# Patient Record
Sex: FEMALE | Race: WHITE | URBAN - METROPOLITAN AREA
[De-identification: names, ages, dates, MRNs, and addresses within clinical notes are randomized per-mention and may not be internally consistent; named-entity substitution may affect disease eponyms.]

---

## 2018-07-05 ENCOUNTER — APPOINTMENT (OUTPATIENT)
Age: 78
Setting detail: DERMATOLOGY
End: 2018-07-11

## 2018-07-05 DIAGNOSIS — L72.8 OTHER FOLLICULAR CYSTS OF THE SKIN AND SUBCUTANEOUS TISSUE: ICD-10-CM

## 2018-07-05 DIAGNOSIS — L60.8 OTHER NAIL DISORDERS: ICD-10-CM

## 2018-07-05 PROCEDURE — OTHER PRESCRIPTION: OTHER

## 2018-07-05 PROCEDURE — 99202 OFFICE O/P NEW SF 15 MIN: CPT

## 2018-07-05 PROCEDURE — OTHER TREATMENT REGIMEN: OTHER

## 2018-07-05 PROCEDURE — OTHER COUNSELING: OTHER

## 2018-07-05 PROCEDURE — OTHER MIPS QUALITY: OTHER

## 2018-07-05 RX ORDER — CARBIT/EQUIS XT/ETHAN/CHIT/MSM
LIQUID (ML) TOPICAL
Qty: 1 | Refills: 0 | Status: ERX | COMMUNITY
Start: 2018-07-05

## 2018-07-05 ASSESSMENT — LOCATION SIMPLE DESCRIPTION DERM
LOCATION SIMPLE: LEFT MIDDLE FINGERNAIL
LOCATION SIMPLE: LEFT THUMBNAIL
LOCATION SIMPLE: LEFT INDEX FINGERNAIL
LOCATION SIMPLE: RIGHT MIDDLE FINGERNAIL
LOCATION SIMPLE: LEFT SMALL FINGERNAIL
LOCATION SIMPLE: RIGHT THUMBNAIL
LOCATION SIMPLE: RIGHT SMALL FINGERNAIL
LOCATION SIMPLE: LEFT HAND
LOCATION SIMPLE: RIGHT INDEX FINGERNAIL
LOCATION SIMPLE: LEFT RING FINGERNAIL
LOCATION SIMPLE: RIGHT RING FINGERNAIL

## 2018-07-05 ASSESSMENT — LOCATION ZONE DERM
LOCATION ZONE: HAND
LOCATION ZONE: FINGERNAIL

## 2018-07-05 ASSESSMENT — LOCATION DETAILED DESCRIPTION DERM
LOCATION DETAILED: LEFT THUMBNAIL
LOCATION DETAILED: RIGHT RING FINGERNAIL
LOCATION DETAILED: RIGHT MIDDLE FINGERNAIL
LOCATION DETAILED: LEFT INDEX FINGERNAIL
LOCATION DETAILED: LEFT RING FINGERNAIL
LOCATION DETAILED: RIGHT SMALL FINGERNAIL
LOCATION DETAILED: LEFT SMALL FINGERNAIL
LOCATION DETAILED: LEFT RADIAL PALM
LOCATION DETAILED: RIGHT INDEX FINGERNAIL
LOCATION DETAILED: LEFT MIDDLE FINGERNAIL
LOCATION DETAILED: RIGHT THUMBNAIL

## 2018-07-05 NOTE — PROCEDURE: MIPS QUALITY
Quality 110: Preventive Care And Screening: Influenza Immunization: Influenza Immunization Administered during Influenza season
Quality 111:Pneumonia Vaccination Status For Older Adults: Pneumococcal Vaccination Previously Received
Quality 226: Preventive Care And Screening: Tobacco Use: Screening And Cessation Intervention: Patient screened for tobacco and is an ex-smoker
Quality 431: Preventive Care And Screening: Unhealthy Alcohol Use - Screening: Patient screened for unhealthy alcohol use using a single question and scores less than 2 times per year
Detail Level: Zone